# Patient Record
Sex: MALE | URBAN - METROPOLITAN AREA
[De-identification: names, ages, dates, MRNs, and addresses within clinical notes are randomized per-mention and may not be internally consistent; named-entity substitution may affect disease eponyms.]

---

## 2020-11-04 ENCOUNTER — APPOINTMENT (RX ONLY)
Dept: URBAN - METROPOLITAN AREA CLINIC 41 | Facility: CLINIC | Age: 25
Setting detail: DERMATOLOGY
End: 2020-11-04

## 2020-11-04 VITALS — TEMPERATURE: 98.4 F

## 2020-11-04 DIAGNOSIS — D485 NEOPLASM OF UNCERTAIN BEHAVIOR OF SKIN: ICD-10-CM

## 2020-11-04 PROBLEM — D48.5 NEOPLASM OF UNCERTAIN BEHAVIOR OF SKIN: Status: ACTIVE | Noted: 2020-11-04

## 2020-11-04 PROCEDURE — 99202 OFFICE O/P NEW SF 15 MIN: CPT

## 2020-11-04 PROCEDURE — ? COUNSELING

## 2020-11-04 PROCEDURE — ? DEFER

## 2020-11-04 PROCEDURE — ? OTHER

## 2020-11-04 ASSESSMENT — LOCATION DETAILED DESCRIPTION DERM
LOCATION DETAILED: LEFT NASOLABIAL FOLD
LOCATION DETAILED: LEFT UPPER CUTANEOUS LIP

## 2020-11-04 ASSESSMENT — LOCATION SIMPLE DESCRIPTION DERM: LOCATION SIMPLE: LEFT LIP

## 2020-11-04 ASSESSMENT — LOCATION ZONE DERM: LOCATION ZONE: LIP

## 2020-11-04 NOTE — PROCEDURE: OTHER
Other (Free Text): Patient will be referred to surgeons for excision if needed. Patient instructed to finish ABX a prior to appointment. \\nGiven samples of CeraVe acne peroxide wash, Ceravw foaming facial wash. Instructed to wash twice daily
Detail Level: Detailed
Note Text (......Xxx Chief Complaint.): This diagnosis correlates with the

## 2020-11-04 NOTE — HPI: SKIN LESION
Is This A New Presentation, Or A Follow-Up?: Growth
What Type Of Note Output Would You Prefer (Optional)?: Standard Output
How Severe Is Your Skin Lesion?: moderate
Has Your Skin Lesion Been Treated?: not been treated
Additional History: Took initial course of Bactrim x 7 days in which s/s decreased. Redness, swelling, and pain returned and patient was  given course of Doxycycline x 14 days. Patient has been on Doxycycline x 1 day

## 2020-11-04 NOTE — PROCEDURE: DEFER
Detail Level: Detailed
Instructions (Optional): Referred to Mohs surgeons for consult & management. Patient instructed to finish course of ABX before being seen by surgeons
Introduction Text (Please End With A Colon): The following procedure was differed due to upcoming family pictures